# Patient Record
Sex: MALE | Race: WHITE | Employment: OTHER | URBAN - METROPOLITAN AREA
[De-identification: names, ages, dates, MRNs, and addresses within clinical notes are randomized per-mention and may not be internally consistent; named-entity substitution may affect disease eponyms.]

---

## 2017-11-07 ENCOUNTER — HOSPITAL ENCOUNTER (OUTPATIENT)
Dept: CARDIAC CATH/INVASIVE PROCEDURES | Age: 52
Discharge: HOME | End: 2017-11-07
Attending: SURGERY | Admitting: SURGERY
Payer: COMMERCIAL

## 2017-11-07 VITALS
SYSTOLIC BLOOD PRESSURE: 178 MMHG | RESPIRATION RATE: 15 BRPM | WEIGHT: 234.19 LBS | DIASTOLIC BLOOD PRESSURE: 94 MMHG | BODY MASS INDEX: 32.79 KG/M2 | HEART RATE: 65 BPM | OXYGEN SATURATION: 96 % | HEIGHT: 71 IN | TEMPERATURE: 96.9 F

## 2017-11-07 LAB
ANION GAP SERPL CALC-SCNC: 9 MMOL/L (ref 3–18)
APTT PPP: 27.3 SEC (ref 23–36.4)
BASOPHILS # BLD: 0.1 K/UL (ref 0–0.06)
BASOPHILS NFR BLD: 1 % (ref 0–2)
BUN SERPL-MCNC: 14 MG/DL (ref 7–18)
BUN/CREAT SERPL: 13 (ref 12–20)
CALCIUM SERPL-MCNC: 9.2 MG/DL (ref 8.5–10.1)
CHLORIDE SERPL-SCNC: 104 MMOL/L (ref 100–108)
CO2 SERPL-SCNC: 24 MMOL/L (ref 21–32)
CREAT SERPL-MCNC: 1.09 MG/DL (ref 0.6–1.3)
DIFFERENTIAL METHOD BLD: ABNORMAL
EOSINOPHIL # BLD: 0.3 K/UL (ref 0–0.4)
EOSINOPHIL NFR BLD: 3 % (ref 0–5)
ERYTHROCYTE [DISTWIDTH] IN BLOOD BY AUTOMATED COUNT: 14.5 % (ref 11.6–14.5)
GLUCOSE BLD STRIP.AUTO-MCNC: 165 MG/DL (ref 70–110)
GLUCOSE SERPL-MCNC: 165 MG/DL (ref 74–99)
HCT VFR BLD AUTO: 43.9 % (ref 36–48)
HGB BLD-MCNC: 14.3 G/DL (ref 13–16)
INR PPP: 1 (ref 0.8–1.2)
LYMPHOCYTES # BLD: 1.2 K/UL (ref 0.9–3.6)
LYMPHOCYTES NFR BLD: 14 % (ref 21–52)
MCH RBC QN AUTO: 26.2 PG (ref 24–34)
MCHC RBC AUTO-ENTMCNC: 32.6 G/DL (ref 31–37)
MCV RBC AUTO: 80.6 FL (ref 74–97)
MONOCYTES # BLD: 0.6 K/UL (ref 0.05–1.2)
MONOCYTES NFR BLD: 6 % (ref 3–10)
NEUTS SEG # BLD: 6.7 K/UL (ref 1.8–8)
NEUTS SEG NFR BLD: 76 % (ref 40–73)
PLATELET # BLD AUTO: 224 K/UL (ref 135–420)
PMV BLD AUTO: 9.8 FL (ref 9.2–11.8)
POTASSIUM SERPL-SCNC: 4.6 MMOL/L (ref 3.5–5.5)
PROTHROMBIN TIME: 12.6 SEC (ref 11.5–15.2)
RBC # BLD AUTO: 5.45 M/UL (ref 4.7–5.5)
SODIUM SERPL-SCNC: 137 MMOL/L (ref 136–145)
WBC # BLD AUTO: 8.8 K/UL (ref 4.6–13.2)

## 2017-11-07 PROCEDURE — C1894 INTRO/SHEATH, NON-LASER: HCPCS

## 2017-11-07 PROCEDURE — 85730 THROMBOPLASTIN TIME PARTIAL: CPT | Performed by: SURGERY

## 2017-11-07 PROCEDURE — C1892 INTRO/SHEATH,FIXED,PEEL-AWAY: HCPCS

## 2017-11-07 PROCEDURE — C1769 GUIDE WIRE: HCPCS

## 2017-11-07 PROCEDURE — 82962 GLUCOSE BLOOD TEST: CPT

## 2017-11-07 PROCEDURE — C1760 CLOSURE DEV, VASC: HCPCS

## 2017-11-07 PROCEDURE — 74011250636 HC RX REV CODE- 250/636: Performed by: SURGERY

## 2017-11-07 PROCEDURE — 36221 PLACE CATH THORACIC AORTA: CPT

## 2017-11-07 PROCEDURE — 80048 BASIC METABOLIC PNL TOTAL CA: CPT | Performed by: SURGERY

## 2017-11-07 PROCEDURE — 74011000250 HC RX REV CODE- 250: Performed by: SURGERY

## 2017-11-07 PROCEDURE — 37246 TRLUML BALO ANGIOP 1ST ART: CPT

## 2017-11-07 PROCEDURE — 85025 COMPLETE CBC W/AUTO DIFF WBC: CPT | Performed by: SURGERY

## 2017-11-07 PROCEDURE — 99152 MOD SED SAME PHYS/QHP 5/>YRS: CPT

## 2017-11-07 PROCEDURE — 77030004534 HC CATH ANGI DX INFN CARD -A

## 2017-11-07 PROCEDURE — 74011636320 HC RX REV CODE- 636/320: Performed by: SURGERY

## 2017-11-07 PROCEDURE — C1725 CATH, TRANSLUMIN NON-LASER: HCPCS

## 2017-11-07 PROCEDURE — C1887 CATHETER, GUIDING: HCPCS

## 2017-11-07 PROCEDURE — 75710 ARTERY X-RAYS ARM/LEG: CPT

## 2017-11-07 PROCEDURE — 77030020643 HC SYR ANGI PWR INJ COVD -A

## 2017-11-07 PROCEDURE — 36246 INS CATH ABD/L-EXT ART 2ND: CPT

## 2017-11-07 PROCEDURE — 74011250636 HC RX REV CODE- 250/636

## 2017-11-07 PROCEDURE — 77030004565 HC CATH ANGI DX TMPO CARD -B

## 2017-11-07 PROCEDURE — 85610 PROTHROMBIN TIME: CPT | Performed by: SURGERY

## 2017-11-07 PROCEDURE — 77030012597

## 2017-11-07 PROCEDURE — 99153 MOD SED SAME PHYS/QHP EA: CPT

## 2017-11-07 RX ORDER — FENTANYL CITRATE 50 UG/ML
25-100 INJECTION, SOLUTION INTRAMUSCULAR; INTRAVENOUS
Status: DISCONTINUED | OUTPATIENT
Start: 2017-11-07 | End: 2017-11-07 | Stop reason: HOSPADM

## 2017-11-07 RX ORDER — HEPARIN SODIUM 200 [USP'U]/100ML
500 INJECTION, SOLUTION INTRAVENOUS ONCE
Status: DISCONTINUED | OUTPATIENT
Start: 2017-11-07 | End: 2017-11-07 | Stop reason: HOSPADM

## 2017-11-07 RX ORDER — IBUPROFEN 200 MG
600 TABLET ORAL
Status: DISCONTINUED | OUTPATIENT
Start: 2017-11-07 | End: 2017-11-07 | Stop reason: HOSPADM

## 2017-11-07 RX ORDER — HEPARIN SODIUM 200 [USP'U]/100ML
500 INJECTION, SOLUTION INTRAVENOUS ONCE
Status: COMPLETED | OUTPATIENT
Start: 2017-11-07 | End: 2017-11-07

## 2017-11-07 RX ORDER — HEPARIN SODIUM 1000 [USP'U]/ML
10-10000 INJECTION, SOLUTION INTRAVENOUS; SUBCUTANEOUS
Status: DISCONTINUED | OUTPATIENT
Start: 2017-11-07 | End: 2017-11-07 | Stop reason: HOSPADM

## 2017-11-07 RX ORDER — HEPARIN SODIUM 1000 [USP'U]/ML
INJECTION, SOLUTION INTRAVENOUS; SUBCUTANEOUS
Status: COMPLETED
Start: 2017-11-07 | End: 2017-11-07

## 2017-11-07 RX ORDER — MIDAZOLAM HYDROCHLORIDE 1 MG/ML
.5-2 INJECTION, SOLUTION INTRAMUSCULAR; INTRAVENOUS
Status: DISCONTINUED | OUTPATIENT
Start: 2017-11-07 | End: 2017-11-07 | Stop reason: HOSPADM

## 2017-11-07 RX ORDER — IODIXANOL 320 MG/ML
1-150 INJECTION, SOLUTION INTRAVASCULAR
Status: DISCONTINUED | OUTPATIENT
Start: 2017-11-07 | End: 2017-11-07 | Stop reason: HOSPADM

## 2017-11-07 RX ORDER — LIDOCAINE HYDROCHLORIDE 10 MG/ML
1-60 INJECTION INFILTRATION; PERINEURAL
Status: DISCONTINUED | OUTPATIENT
Start: 2017-11-07 | End: 2017-11-07 | Stop reason: HOSPADM

## 2017-11-07 RX ORDER — CEFAZOLIN SODIUM 2 G/50ML
2 SOLUTION INTRAVENOUS ONCE
Status: COMPLETED | OUTPATIENT
Start: 2017-11-07 | End: 2017-11-07

## 2017-11-07 RX ORDER — MIDAZOLAM HYDROCHLORIDE 1 MG/ML
INJECTION, SOLUTION INTRAMUSCULAR; INTRAVENOUS
Status: COMPLETED
Start: 2017-11-07 | End: 2017-11-07

## 2017-11-07 RX ORDER — SODIUM CHLORIDE 9 MG/ML
500 INJECTION, SOLUTION INTRAVENOUS CONTINUOUS
Status: DISCONTINUED | OUTPATIENT
Start: 2017-11-07 | End: 2017-11-07 | Stop reason: HOSPADM

## 2017-11-07 RX ORDER — FENTANYL CITRATE 50 UG/ML
INJECTION, SOLUTION INTRAMUSCULAR; INTRAVENOUS
Status: COMPLETED
Start: 2017-11-07 | End: 2017-11-07

## 2017-11-07 RX ADMIN — FENTANYL CITRATE 50 MCG: 50 INJECTION, SOLUTION INTRAMUSCULAR; INTRAVENOUS at 08:38

## 2017-11-07 RX ADMIN — MIDAZOLAM HYDROCHLORIDE 1 MG: 1 INJECTION, SOLUTION INTRAMUSCULAR; INTRAVENOUS at 08:10

## 2017-11-07 RX ADMIN — HEPARIN SODIUM 1000 UNITS: 200 INJECTION, SOLUTION INTRAVENOUS at 08:33

## 2017-11-07 RX ADMIN — FENTANYL CITRATE 50 MCG: 50 INJECTION, SOLUTION INTRAMUSCULAR; INTRAVENOUS at 08:10

## 2017-11-07 RX ADMIN — FENTANYL CITRATE 50 MCG: 50 INJECTION, SOLUTION INTRAMUSCULAR; INTRAVENOUS at 07:59

## 2017-11-07 RX ADMIN — HEPARIN SODIUM 5000 UNITS: 1000 INJECTION, SOLUTION INTRAVENOUS; SUBCUTANEOUS at 08:22

## 2017-11-07 RX ADMIN — MIDAZOLAM HYDROCHLORIDE 1 MG: 1 INJECTION, SOLUTION INTRAMUSCULAR; INTRAVENOUS at 08:53

## 2017-11-07 RX ADMIN — MIDAZOLAM HYDROCHLORIDE 1 MG: 1 INJECTION, SOLUTION INTRAMUSCULAR; INTRAVENOUS at 08:38

## 2017-11-07 RX ADMIN — MIDAZOLAM HYDROCHLORIDE 1 MG: 1 INJECTION, SOLUTION INTRAMUSCULAR; INTRAVENOUS at 08:27

## 2017-11-07 RX ADMIN — FENTANYL CITRATE 50 MCG: 50 INJECTION, SOLUTION INTRAMUSCULAR; INTRAVENOUS at 08:27

## 2017-11-07 RX ADMIN — CEFAZOLIN SODIUM 2 G: 2 SOLUTION INTRAVENOUS at 07:30

## 2017-11-07 RX ADMIN — MIDAZOLAM HYDROCHLORIDE 1 MG: 1 INJECTION, SOLUTION INTRAMUSCULAR; INTRAVENOUS at 07:59

## 2017-11-07 RX ADMIN — IODIXANOL 70 ML: 320 INJECTION, SOLUTION INTRAVASCULAR at 09:05

## 2017-11-07 RX ADMIN — SODIUM CHLORIDE 500 ML: 900 INJECTION, SOLUTION INTRAVENOUS at 07:17

## 2017-11-07 RX ADMIN — LIDOCAINE HYDROCHLORIDE 10 ML: 10 INJECTION, SOLUTION INFILTRATION; PERINEURAL at 08:07

## 2017-11-07 RX ADMIN — HEPARIN SODIUM 1000 UNITS: 200 INJECTION, SOLUTION INTRAVENOUS at 08:32

## 2017-11-07 RX ADMIN — FENTANYL CITRATE 50 MCG: 50 INJECTION, SOLUTION INTRAMUSCULAR; INTRAVENOUS at 08:53

## 2017-11-07 NOTE — PROGRESS NOTES
[facility] Cardiac Cath Lab:  Pre Procedure Chart Check    Patients chart was accessed and reviewed for possible and/or scheduled procedure. Creatinine Clearance:  Creatinine clearance cannot be calculated (Patient's most recent sCr result is older than the maximum 180 days allowed.)    Total Contrast  Load:  3 x estimated clearance amount=       ml    75% of Contrast Load:  0.75 x Total Contrast Load=        ml      No results for input(s): WBC, RBC, HCT, HGB, PLT, INR, APTT, PTP, NA, K, BUN, CREA, GFRAA, GFRNA, CA, MAG, CPK, CKMB, CKNDX, CKND1, TROPT, TROIQ, BNPP, BNP, HCTEXT, HGBEXT, PLTEXT in the last 72 hours. No lab exists for component: DDIMER, GLUCOSE, INREXT    BMI: Body mass index is 32.66 kg/(m^2).     ALLERGIES:   Allergies   Allergen Reactions    Hydrocodone-Acetaminophen Swelling     Muscle spasam    Percocet [Oxycodone-Acetaminophen] Seizures       Lines:        Peripheral IV 11/07/17 Left Hand (Active)   Site Assessment Clean, dry, & intact 11/7/2017  7:15 AM   Phlebitis Assessment 0 11/7/2017  7:15 AM   Infiltration Assessment 0 11/7/2017  7:15 AM   Dressing Status Clean, dry, & intact 11/7/2017  7:15 AM   Dressing Type Transparent;Tape 11/7/2017  7:15 AM   Hub Color/Line Status Infusing;Pink 11/7/2017  7:15 AM          History:  Past Medical History:   Diagnosis Date    CAD (coronary artery disease)     Depression     Diabetes ([address]     Embolism ([address] 10/21/2013    renal infarction    Essential hypertension     Heart abnormality     Sleep apnea     uses cpap    Subclavian steal syndrome      Past Surgical History:   Procedure Laterality Date    HX CORONARY STENT PLACEMENT  10/25/2013    HX OPEN GASTRIC BYPASS  07/15/2016    HX ORTHOPAEDIC  9/2011    left ankle recostruction    HX OTHER SURGICAL  10/25/2013    renal infarction    HX OTHER SURGICAL      Lipoma removal (RT wrist)     Patient Active Problem List   Diagnosis Code    PVD (peripheral vascular disease) ([facility] I73.9    Flank pain R10.9    Renal infarction ([address] N28.0    Left renal artery stenosis (HCC) I70.1    Elevated PSA R97.20    Tobacco abuse Z72.0    ASO (arteriosclerosis obliterans) I70.90    Essential hypertension I10    Depression F32.9

## 2017-11-07 NOTE — PROGRESS NOTES
TRANSFER - IN REPORT:    Telephone report received from Antwon Mccain (name) on Ac Minion  being received from cath/vascular lab (unit) for routine post - op      Report consisted of patients Situation, Background, Assessment and   Recommendations(SBAR). Information from the following report(s) SBAR, Procedure Summary, MAR and Cardiac Rhythm NSR was reviewed with the receiving nurse. Opportunity for questions and clarification was provided.

## 2017-11-07 NOTE — PROGRESS NOTES
IVCU:    5153 Pt received via bed from cath/vascular lab post angiogram. Pt is alert and denies pain. Dressing to right groin is clean, dry, and intact. No bleeding or hematoma present. Right pedal and post tibial pulses palpable. Will continue to monitor per IVCU protocol and MD orders. Plan for 4 hours of bedrest then discharge to home if no complications with recovery. 2779 Dr. Reanna Boogie at the bedside to assess pt. MD has spoken to wife post procedure. Clarified pt to take plavix at home. Pt to be discharged to home and follow up in 1 month. 1530 Discharge instructions reviewed with pt and pt wife. Questions asked and answered. Pt ambulated and allowed to dress. No complaints of pain, shortness of breath, dizziness, or nausea. Pt escorted via wheelchair to car to be driven by wife. Pt left in stable condition.

## 2017-11-07 NOTE — H&P
Date of Surgery Update:  Daniele Kenney was seen and examined. History and physical has been reviewed. The patient has been examined.  There have been no significant clinical changes since the completion of the originally dated History and Physical.    Signed By: Nikkie Natarajan MD     November 7, 2017 7:47 AM

## 2017-11-07 NOTE — DISCHARGE INSTRUCTIONS
extremity: change in color, persistent  numbness, tingling, coldness or increase pain  · Any questions    What to do at Home:  Recommended activity: Activity as tolerated and no driving for today and No heavy lifting for 1 week, no greater than 10 pounds. If you experience any of the following symptoms pain, swelling, or bleeding at the puncture site, please follow up with emergency room/911. *  Please give a list of your current medications to your Primary Care Provider. *  Please update this list whenever your medications are discontinued, doses are      changed, or new medications (including over-the-counter products) are added. *  Please carry medication information at all times in case of emergency situations. These are general instructions for a healthy lifestyle:    No smoking/ No tobacco products/ Avoid exposure to second hand smoke  Surgeon General's Warning:  Quitting smoking now greatly reduces serious risk to your health. Obesity, smoking, and sedentary lifestyle greatly increases your risk for illness    A healthy diet, regular physical exercise & weight monitoring are important for maintaining a healthy lifestyle    You may be retaining fluid if you have a history of heart failure or if you experience any of the following symptoms:  Weight gain of 3 pounds or more overnight or 5 pounds in a week, increased swelling in our hands or feet or shortness of breath while lying flat in bed. Please call your doctor as soon as you notice any of these symptoms; do not wait until your next office visit. Recognize signs and symptoms of STROKE:    F-face looks uneven    A-arms unable to move or move unevenly    S-speech slurred or non-existent    T-time-call 911 as soon as signs and symptoms begin-DO NOT go       Back to bed or wait to see if you get better-TIME IS BRAIN. Warning Signs of HEART ATTACK     Call 911 if you have these symptoms:   Chest discomfort.  Most heart attacks involve discomfort in the center of the chest that lasts more than a few minutes, or that goes away and comes back. It can feel like uncomfortable pressure, squeezing, fullness, or pain.  Discomfort in other areas of the upper body. Symptoms can include pain or discomfort in one or both arms, the back, neck, jaw, or stomach.  Shortness of breath with or without chest discomfort.  Other signs may include breaking out in a cold sweat, nausea, or lightheadedness. Don't wait more than five minutes to call 911 - MINUTES MATTER! Fast action can save your life. Calling 911 is almost always the fastest way to get lifesaving treatment. Emergency Medical Services staff can begin treatment when they arrive -- up to an hour sooner than if someone gets to the hospital by car. The discharge information has been reviewed with the patient and spouse. The patient and spouse verbalized understanding. Discharge medications reviewed with the patient and spouse and appropriate educational materials and side effects teaching were provided. Patient armband removed and shredded.

## 2017-11-07 NOTE — IP AVS SNAPSHOT
Cynthia Macedo 
 
 
 [address] 
[phone] Patient: Tanika Ernandez MRN: [id number] :1965 About your hospitalization You were admitted on:  2017 You last received care in the:  [facility] INTENSIVE [facility] 3 You were discharged on:  2017 Why you were hospitalized Your primary diagnosis was:  Not on File Things You Need To Do (next 8 weeks) Follow up with Kaycee Bourgeois in 1 month(s) The office will call to schedule appointment. Where:  [address] [place], [address] [facility] 
[phone] Follow up with Bharati Howard MD  
Follow up as previously scheduled. Phone:  [phone] Where:  [address] [facility], [address] [facility], [address] [facility] Discharge Orders None A check micky indicates which time of day the medication should be taken. My Medications ASK your physician about these medications Instructions Each Dose to Equal  
 Morning Noon Evening Bedtime  
 aspirin delayed-release 81 mg tablet Your next dose is:  today Take  by mouth daily. atorvastatin 40 mg tablet Commonly known as:  LIPITOR Your next dose is:  today Take  by mouth daily. BYSTOLIC 20 mg tablet Generic drug:  nebivolol Your next dose is:  today Take 10 mg by mouth daily. 10 mg  
    
   
   
   
  
 CYMBALTA 60 mg capsule Generic drug:  DULoxetine Your next dose is:  today Take 60 mg by mouth two (2) times a day. 60 mg  
    
   
   
   
  
 JANUVIA 100 mg tablet Generic drug:  SITagliptin Your next dose is:  today Take 100 mg by mouth daily. 100 mg LEVEMIR FLEXPEN 100 unit/mL (3 mL) Inpn Generic drug:  insulin detemir Your next dose is:  today  
   
 by SubCUTAneous route. pantoprazole 40 mg tablet Commonly known as:  PROTONIX Your next dose is:  today Take 40 mg by mouth daily. 40 mg  
    
   
   
   
  
 PLAVIX 75 mg Tab Generic drug:  clopidogrel Your next dose is:  today Take 75 mg by mouth daily. 75 mg Discharge Instructions Vascular Angiography Discharge Instructions *Check the puncture site frequently for swelling or bleeding. If you see any bleeding, lie down and apply pressure over the area with a clean town or washcloth. Notify your doctor for any redness, swelling, drainage or oozing from the puncture site. Notify your doctor for any fever or chills. *If the leg with the puncture becomes cold, numb or painful, call Dr Malu Rizo at  [phone]. *Activity should be limited for the next 48 hours. Climb stairs as little as possible and avoid any stooping, bending or strenuous activity for 48 hours. No heavy lifting, pushing, or pulling (anything over 10 pounds) for 7 days. *Do not drive for 24 hours. *You may resume your usual diet. Drink more fluids than usual. 
 
*Have a responsible person drive you home and stay with you for at least 24 hours after your angiography. *You may remove the bandage from your right groin in 24 hours. You may shower in 24 hours. No tub baths, hot tubs or swimming for one week. Do not place any lotions, creams, powders, ointments over the puncture site for one week. You may place a clean band-aid over the puncture site each day for 5 days. Change this daily. DISCHARGE SUMMARY from Nurse PATIENT INSTRUCTIONS: 
 
 
F-face looks uneven A-arms unable to move or move unevenly S-speech slurred or non-existent T-time-call 911 as soon as signs and symptoms begin-DO NOT go Back to bed or wait to see if you get better-TIME IS BRAIN. Warning Signs of HEART ATTACK Call 911 if you have these symptoms: 
? Chest discomfort. Most heart attacks involve discomfort in the center of the chest that lasts more than a few minutes, or that goes away and comes back. It can feel like uncomfortable pressure, squeezing, fullness, or pain. ? Discomfort in other areas of the upper body. Symptoms can include pain or discomfort in one or both arms, the back, neck, jaw, or stomach. ? Shortness of breath with or without chest discomfort. ? Other signs may include breaking out in a cold sweat, nausea, or lightheadedness. Don't wait more than five minutes to call [address]! Fast action can save your life. Calling 911 is almost always the fastest way to get lifesaving treatment. Emergency Medical Services staff can begin treatment when they arrive  up to an hour sooner than if someone gets to the hospital by car. The discharge information has been reviewed with the patient and spouse. The patient and spouse verbalized understanding. Discharge medications reviewed with the patient and spouse and appropriate educational materials and side effects teaching were provided. Patient armband removed and shredded. Introducing [facility] SERVICES! Melissa Sosa introduces [facility] patient portal. Now you can access parts of your medical record, email your doctor's office, and request medication refills online. 1. In your internet browser, go to [email or web address] 2. Click on the First Time User? Click Here link in the Sign In box. You will see the New Member Sign Up page. 3. Enter your [facility] Access Code exactly as it appears below.  You will not need to use this code after youve completed the sign-up process. If you do not sign up before the expiration date, you must request a new code. · [employer] Access Code: [email or web address] Expires: 12/14/2017  8:43 AM 
 
4. Enter the last four digits of your Social Security Number (xxxx) and Date of Birth (mm/dd/yyyy) as indicated and click Submit. You will be taken to the next sign-up page. 5. Create a [employer] ID. This will be your [employer] login ID and cannot be changed, so think of one that is secure and easy to remember. 6. Create a [employer] password. You can change your password at any time. 7. Enter your Password Reset Question and Answer. This can be used at a later time if you forget your password. 8. Enter your e-mail address. You will receive e-mail notification when new information is available in [address] 9. Click Sign Up. You can now view and download portions of your medical record. 10. Click the Download Summary menu link to download a portable copy of your medical information. If you have questions, please visit the Frequently Asked Questions section of the [employer] website. Remember, [employer] is NOT to be used for urgent needs. For medical emergencies, dial 911. Now available from your iPhone and Android! Providers Seen During Your Hospitalization Provider Specialty Primary office phone Ishan Fu MD Vascular Surgery [phone] Your Primary Care Physician (PCP) Primary Care Physician Office Phone Office Fax Eleno Law [phone] You are allergic to the following Allergen Reactions Hydrocodone-Acetaminophen Swelling Muscle spasam  
    
 Percocet (Oxycodone-Acetaminophen) Seizures Recent Documentation Height Weight BMI Smoking Status 1.803 m 106.2 kg 32.66 kg/m2 Former Smoker Emergency Contacts Name Discharge Info Relation Home Work Mobile Natalie Avila DISCHARGE CAREGIVER [3] Spouse [3] [phone] Jennifer Flower  Spouse [3] [phone] Patient Belongings The following personal items are in your possession at time of discharge: 
  Dental Appliances: None  Visual Aid: None      Home Medications: None   Jewelry: None  Clothing: Undergarments, Socks, Shirt, Shorts, Footwear    Other Valuables: Radha Purcell, Batsheva Please provide this summary of care documentation to your next provider. Signatures-by signing, you are acknowledging that this After Visit Summary has been reviewed with you and you have received a copy. Patient Signature:  ____________________________________________________________ Date:  ____________________________________________________________  
  
[facility] Gene Provider Signature:  ____________________________________________________________ Date:  ____________________________________________________________

## 2017-11-07 NOTE — PROCEDURES
Brief Op Note  11/7/2017    Hospital:  [facility]    Indications: Right upper extremity claudication  Procedure Details:    Arch aortorgram   Selective RUE angiogram   PTA R brachial artery   Surgeon: Princess Mercado MD  Findings: >90% stenosis underwent PTA with <10% residual stenosis  Estimated Blood Loss:  none  Drains: none   Total IV Fluids: 1 00ml   Heparin: 5000 units  Contrast:  70 cc  Complications: none    MALIK Hedrick MD, FACS

## 2017-11-08 NOTE — OP NOTES
Chele Burks    Name:  Yuan Sanders  MR#:  [id number]  :  1965  Account #:  [de-identified]  Date of Adm:  2017  Date of Surgery:  2017      PREOPERATIVE DIAGNOSIS: Right upper extremity claudication. POSTOPERATIVE DIAGNOSIS: Right upper extremity claudication. PROCEDURES PERFORMED:  1. Thoracic aortogram.  2. Right upper extremity angiogram (third order). 3. Percutaneous transluminal angioplasty, right brachial artery, with a 5  x 20 balloon. ESTIMATED BLOOD LOSS:  None. SPECIMENS REMOVED: None    ANESTHESIA:  Moderate sedation. SURGEON: Makenzie Rose MD    INDICATIONS: This is a 28-year-old gentleman well known to [address] [place] [address] and [facility]. The patient had previously undergone a  right carotid to subclavian bypass with greater saphenous vein. The  patient has undergone multiple stents in the past for increasing  claudication. The patient now returns with increasing claudication of his  right upper extremity. Noninvasive studies were obtained, and a duplex  showed near occlusion of the brachial artery just beyond a previously  placed stent. The patient was scheduled for angiogram and possible  intervention. FINDINGS  1. Type 1 aortic arch. 2. Patent right carotid subclavian bypass. 3. Near occlusion of the brachial artery just distal to the previously  placed stent. COMPLICATIONS: None. CONTRAST: 70 mL. HEPARIN: 5000 units. PROCEDURE: After informed consent was obtained, the patient was  brought to the angio suite, placed in a supine position. The right groin  was prepped and draped in the usual sterile fashion. Timeout was  performed. After adequate local anesthesia, a microneedle was placed  into the right common femoral artery. A micro wire was advanced. Microneedle was exchanged for a MicroSheath. The MicroSheath was  exchanged for a 5-Haitian sheath. A UF catheter was placed in the  ascending aorta.  A thoracic aortogram was performed with 40 mL of  contrast. Once this was done, a Glidewire was advanced into the right  common and carotid artery. The Glidewire was exchanged for an  Amplatz. A 70 cm, 6-Ethiopian Verdis Felty was then placed over the wire and  exchanged for the 5-Ethiopian sheath. Next, a Glidewire and  Percell Adas were advanced through the previously placed stents. Re-  entry into the non-occluded brachial artery was verified with a hand  injection. An attempt to place a 0.035 balloon over the wire was  unsuccessful. I was unable to manipulate the wire through the  previously placed stents. Therefore, the Glidewire was exchanged for a  0.014 wire. The patient received 5000 units of heparin. Once the  0.014 wire was placed, a 4 x 40 balloon was placed over the wire. The balloon was then inflated in the area of stenosis to 8 atmospheres  and held in place for 2 minutes. Next, an attempt to place a 5 x 100 balloon was unsuccessful. However, I was able to advance a 5 x 20 balloon over the wire. The  balloon was inflated to 10 atmospheres and held in place for 2  minutes. Repeat hand injection showed less than 10% residual  stenosis. At this point, the balloons and wires were removed. The Raabe sheath  was exchanged for an 11 cm, 6-Ethiopian sheath. A 6-Ethiopian Exoseal was  deployed successfully. The patient tolerated the procedure well. He  was noted to have a palpable radial pulse at the end of the procedure. The patient also stated that his hand was warm.         MD NEHEMIAH Singleton  D:  11/07/2017   12:54  T:  11/08/2017   14:02  Job #:  [id number]

## 2019-07-02 PROBLEM — I21.4 NON-STEMI (NON-ST ELEVATED MYOCARDIAL INFARCTION) (HCC): Status: ACTIVE | Noted: 2019-07-02

## 2019-07-03 PROBLEM — K92.2 GASTROINTESTINAL BLEED: Status: ACTIVE | Noted: 2019-07-03

## 2020-12-24 PROBLEM — I21.3 STEMI (ST ELEVATION MYOCARDIAL INFARCTION) (HCC): Status: ACTIVE | Noted: 2020-01-01

## 2020-12-25 PROBLEM — I21.3 STEMI (ST ELEVATION MYOCARDIAL INFARCTION) (HCC): Chronic | Status: ACTIVE | Noted: 2020-01-01

## 2021-04-25 PROBLEM — R07.9 CHEST PAIN: Status: ACTIVE | Noted: 2021-01-01

## 2021-04-25 PROBLEM — R47.1 DYSARTHRIA: Status: ACTIVE | Noted: 2021-01-01
